# Patient Record
Sex: MALE
[De-identification: names, ages, dates, MRNs, and addresses within clinical notes are randomized per-mention and may not be internally consistent; named-entity substitution may affect disease eponyms.]

---

## 2020-06-09 PROBLEM — Z00.00 ENCOUNTER FOR PREVENTIVE HEALTH EXAMINATION: Status: ACTIVE | Noted: 2020-06-09

## 2023-04-10 ENCOUNTER — APPOINTMENT (OUTPATIENT)
Dept: CARDIOLOGY | Facility: CLINIC | Age: 73
End: 2023-04-10
Payer: MEDICARE

## 2023-04-10 ENCOUNTER — NON-APPOINTMENT (OUTPATIENT)
Age: 73
End: 2023-04-10

## 2023-04-10 VITALS — SYSTOLIC BLOOD PRESSURE: 146 MMHG | DIASTOLIC BLOOD PRESSURE: 70 MMHG

## 2023-04-10 VITALS
DIASTOLIC BLOOD PRESSURE: 62 MMHG | BODY MASS INDEX: 21.52 KG/M2 | HEIGHT: 68 IN | SYSTOLIC BLOOD PRESSURE: 102 MMHG | HEART RATE: 57 BPM | OXYGEN SATURATION: 96 % | WEIGHT: 142 LBS

## 2023-04-10 DIAGNOSIS — Z87.09 PERSONAL HISTORY OF OTHER DISEASES OF THE RESPIRATORY SYSTEM: ICD-10-CM

## 2023-04-10 DIAGNOSIS — E11.9 TYPE 2 DIABETES MELLITUS W/OUT COMPLICATIONS: ICD-10-CM

## 2023-04-10 DIAGNOSIS — Z87.891 PERSONAL HISTORY OF NICOTINE DEPENDENCE: ICD-10-CM

## 2023-04-10 DIAGNOSIS — Z78.9 OTHER SPECIFIED HEALTH STATUS: ICD-10-CM

## 2023-04-10 DIAGNOSIS — Z82.3 FAMILY HISTORY OF STROKE: ICD-10-CM

## 2023-04-10 DIAGNOSIS — S88.919A COMPLETE TRAUMATIC AMPUTATION OF UNSPECIFIED LOWER LEG, LVL UNSPECIFIED, INITIAL ENCOUNTER: ICD-10-CM

## 2023-04-10 DIAGNOSIS — Z82.49 FAMILY HISTORY OF ISCHEMIC HEART DISEASE AND OTHER DISEASES OF THE CIRCULATORY SYSTEM: ICD-10-CM

## 2023-04-10 PROCEDURE — 99204 OFFICE O/P NEW MOD 45 MIN: CPT

## 2023-04-10 RX ORDER — CLOPIDOGREL BISULFATE 75 MG/1
75 TABLET, FILM COATED ORAL DAILY
Qty: 90 | Refills: 3 | Status: ACTIVE | COMMUNITY

## 2023-04-10 RX ORDER — GABAPENTIN 300 MG/1
300 CAPSULE ORAL 3 TIMES DAILY
Refills: 0 | Status: ACTIVE | COMMUNITY

## 2023-04-10 RX ORDER — ATORVASTATIN CALCIUM 20 MG/1
20 TABLET, FILM COATED ORAL
Refills: 0 | Status: ACTIVE | COMMUNITY

## 2023-04-10 NOTE — REASON FOR VISIT
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  Dr. Araujo performed the amputation.\par \par The patient is able to ride a bicycle.  The patient arrives for fairly prolonged periods without exertional chest discomfort.  The patient recently was admitted with what was felt to be a COPD exacerbation.  He was significantly short of breath.  The patient is improving and there has been no shortness of breath recently.\par \par The patient has lost a tremendous amount of weight.  Previously had hypertension.  He stopped all of his pharmacologic therapy for hypertension and he has been persistently normotensive and on the low side of blood pressure at home.  There are no complaints.

## 2023-04-10 NOTE — ASSESSMENT
[FreeTextEntry1] : Shortness of breath: The patient would not be able to exercise adequately for diagnostic purposes because of an amputation.  Pharmacologic nuclear stress testing is the best option to rule out ischemia.  Transthoracic echocardiography has been arranged to rule out valvulopathy.\par \par Hypertension: The patient is fairly persistently with low blood pressure at home on both sides.  Overall we will withhold pharmacologic therapy for hypertension at this time.\par \par The patient is present with his niece who is extremely involved in the patient's health care.

## 2023-04-27 ENCOUNTER — APPOINTMENT (OUTPATIENT)
Dept: CARDIOLOGY | Facility: CLINIC | Age: 73
End: 2023-04-27
Payer: MEDICARE

## 2023-04-27 PROCEDURE — 93015 CV STRESS TEST SUPVJ I&R: CPT

## 2023-04-27 PROCEDURE — 93306 TTE W/DOPPLER COMPLETE: CPT

## 2023-04-27 PROCEDURE — 78452 HT MUSCLE IMAGE SPECT MULT: CPT

## 2023-04-27 PROCEDURE — A9502: CPT

## 2023-05-01 ENCOUNTER — APPOINTMENT (OUTPATIENT)
Dept: CARDIOLOGY | Facility: CLINIC | Age: 73
End: 2023-05-01
Payer: MEDICARE

## 2023-05-01 VITALS
DIASTOLIC BLOOD PRESSURE: 70 MMHG | WEIGHT: 142 LBS | BODY MASS INDEX: 21.52 KG/M2 | SYSTOLIC BLOOD PRESSURE: 110 MMHG | HEART RATE: 73 BPM | OXYGEN SATURATION: 94 % | HEIGHT: 68 IN

## 2023-05-01 DIAGNOSIS — I10 ESSENTIAL (PRIMARY) HYPERTENSION: ICD-10-CM

## 2023-05-01 DIAGNOSIS — R06.00 DYSPNEA, UNSPECIFIED: ICD-10-CM

## 2023-05-01 PROCEDURE — 99213 OFFICE O/P EST LOW 20 MIN: CPT

## 2023-05-01 NOTE — ASSESSMENT
[FreeTextEntry1] : Shortness of breath: None recently.  The patient has a prosthesis because of his amputation.  He walks fairly well with a walker.  There is no exertional symptoms.  Noninvasive testing is negative.\par \par Hypertension: Well-controlled.\par \par Hyperlipidemia: Continue statin therapy.  The patient gets routine phlebotomy through the primary care doctor's office.\par \par Peripheral vascular disease: The patient was given clopidogrel by Dr. Araujo, will continue.

## 2024-10-30 ENCOUNTER — OFFICE (OUTPATIENT)
Dept: URBAN - METROPOLITAN AREA CLINIC 9 | Facility: CLINIC | Age: 74
Setting detail: OPHTHALMOLOGY
End: 2024-10-30
Payer: MEDICARE

## 2024-10-30 DIAGNOSIS — H11.153: ICD-10-CM

## 2024-10-30 DIAGNOSIS — E11.9: ICD-10-CM

## 2024-10-30 DIAGNOSIS — H01.001: ICD-10-CM

## 2024-10-30 DIAGNOSIS — H35.40: ICD-10-CM

## 2024-10-30 DIAGNOSIS — H01.004: ICD-10-CM

## 2024-10-30 DIAGNOSIS — H25.13: ICD-10-CM

## 2024-10-30 PROCEDURE — 92004 COMPRE OPH EXAM NEW PT 1/>: CPT | Performed by: OPHTHALMOLOGY

## 2024-10-30 ASSESSMENT — REFRACTION_AUTOREFRACTION
OD_AXIS: 136
OS_SPHERE: +0.50
OS_CYLINDER: +1.50
OS_AXIS: 167
OD_CYLINDER: +0.25
OD_SPHERE: -19.00

## 2024-10-30 ASSESSMENT — REFRACTION_MANIFEST
OS_CYLINDER: +1.00
OS_SPHERE: +0.50
OD_VA1: HM
OD_AXIS: 135
OS_VA2: 20/25(J1)
OS_ADD: +3.50
OU_VA: 20/40+1
OD_CYLINDER: +0.25
OD_SPHERE: -19.00
OD_VA2: HM
OD_ADD: +3.50
OS_AXIS: 165
OS_VA1: 20/40+1

## 2024-10-30 ASSESSMENT — KERATOMETRY
OS_AXISANGLE_DEGREES: 167
METHOD_AUTO_MANUAL: AUTO
OD_AXISANGLE_DEGREES: 010
OS_K2POWER_DIOPTERS: 44.50
OD_K2POWER_DIOPTERS: 43.75
OS_K1POWER_DIOPTERS: 43.50
OD_K1POWER_DIOPTERS: 42.50

## 2024-10-30 ASSESSMENT — REFRACTION_CURRENTRX
OS_SPHERE: +1.25
OD_VPRISM_DIRECTION: BF
OS_ADD: +3.25
OD_SPHERE: PLANO
OD_OVR_VA: 20/
OS_VPRISM_DIRECTION: BF
OD_ADD: +3.25
OS_OVR_VA: 20/
OS_CYLINDER: SPHERE

## 2024-10-30 ASSESSMENT — LID EXAM ASSESSMENTS
OS_BLEPHARITIS: LUL 2+
OD_BLEPHARITIS: RUL 2+

## 2024-10-30 ASSESSMENT — VISUAL ACUITY
OD_BCVA: 20/60+1
OS_BCVA: HM

## 2024-10-30 ASSESSMENT — CONFRONTATIONAL VISUAL FIELD TEST (CVF): OS_FINDINGS: FULL

## 2024-11-21 ENCOUNTER — OFFICE (OUTPATIENT)
Dept: URBAN - METROPOLITAN AREA CLINIC 38 | Facility: CLINIC | Age: 74
Setting detail: OPHTHALMOLOGY
End: 2024-11-21
Payer: MEDICARE

## 2024-11-21 DIAGNOSIS — H25.13: ICD-10-CM

## 2024-11-21 PROCEDURE — 99214 OFFICE O/P EST MOD 30 MIN: CPT | Performed by: OPHTHALMOLOGY

## 2024-11-21 ASSESSMENT — KERATOMETRY
OS_K1POWER_DIOPTERS: 44.00
METHOD_AUTO_MANUAL: AUTO
OS_AXISANGLE_DEGREES: 162
OS_K2POWER_DIOPTERS: 44.75

## 2024-11-21 ASSESSMENT — REFRACTION_AUTOREFRACTION
OS_CYLINDER: -0.75
OS_AXIS: 048
OS_SPHERE: +1.75

## 2024-11-21 ASSESSMENT — VISUAL ACUITY
OS_BCVA: HM
OD_BCVA: 20/60+1

## 2024-11-21 ASSESSMENT — REFRACTION_MANIFEST
OS_ADD: +3.50
OS_AXIS: 075
OS_VA1: 20/40+1
OD_AXIS: 045
OS_CYLINDER: -1.00
OD_VA2: HM
OS_VA2: 20/25(J1)
OD_CYLINDER: -0.25
OD_VA1: HM
OD_SPHERE: -18.75
OD_ADD: +3.50
OU_VA: 20/40+1
OS_SPHERE: +1.50

## 2024-11-21 ASSESSMENT — REFRACTION_CURRENTRX
OS_OVR_VA: 20/
OS_SPHERE: +1.25
OD_VPRISM_DIRECTION: BF
OS_VPRISM_DIRECTION: BF
OD_ADD: +3.25
OS_CYLINDER: SPHERE
OS_ADD: +3.25
OD_SPHERE: PLANO
OD_OVR_VA: 20/

## 2024-11-21 ASSESSMENT — CONFRONTATIONAL VISUAL FIELD TEST (CVF)
OD_FINDINGS: FULL
OS_FINDINGS: FULL

## 2024-11-21 ASSESSMENT — TONOMETRY
OS_IOP_MMHG: 19
OD_IOP_MMHG: 17

## 2024-11-21 ASSESSMENT — LID EXAM ASSESSMENTS
OS_BLEPHARITIS: LUL 2+
OD_BLEPHARITIS: RUL 2+

## 2025-01-02 ENCOUNTER — OFFICE (OUTPATIENT)
Dept: URBAN - METROPOLITAN AREA CLINIC 38 | Facility: CLINIC | Age: 75
Setting detail: OPHTHALMOLOGY
End: 2025-01-02
Payer: MEDICARE

## 2025-01-02 DIAGNOSIS — H25.11: ICD-10-CM

## 2025-01-02 DIAGNOSIS — H25.13: ICD-10-CM

## 2025-01-02 PROCEDURE — 99213 OFFICE O/P EST LOW 20 MIN: CPT | Performed by: OPHTHALMOLOGY

## 2025-01-02 PROCEDURE — 92136 OPHTHALMIC BIOMETRY: CPT | Mod: TC | Performed by: OPHTHALMOLOGY

## 2025-01-02 PROCEDURE — 92136 OPHTHALMIC BIOMETRY: CPT | Mod: 26,RT | Performed by: OPHTHALMOLOGY

## 2025-01-02 ASSESSMENT — REFRACTION_CURRENTRX
OS_VPRISM_DIRECTION: BF
OS_OVR_VA: 20/
OD_ADD: +3.25
OS_CYLINDER: SPHERE
OD_VPRISM_DIRECTION: BF
OS_ADD: +3.25
OD_OVR_VA: 20/
OD_SPHERE: PLANO
OS_SPHERE: +1.25

## 2025-01-02 ASSESSMENT — KERATOMETRY
OS_AXISANGLE_DEGREES: 162
OS_K1POWER_DIOPTERS: 44.00
METHOD_AUTO_MANUAL: AUTO
OS_K2POWER_DIOPTERS: 44.75

## 2025-01-02 ASSESSMENT — REFRACTION_MANIFEST
OS_VA2: 20/25(J1)
OU_VA: 20/40+1
OD_CYLINDER: -0.25
OD_VA2: HM
OS_ADD: +3.50
OS_AXIS: 075
OS_CYLINDER: -1.00
OD_VA1: HM
OS_SPHERE: +1.50
OS_VA1: 20/40+1
OD_SPHERE: -18.75
OD_ADD: +3.50
OD_AXIS: 045

## 2025-01-02 ASSESSMENT — VISUAL ACUITY
OS_BCVA: HM
OD_BCVA: 20/60+1

## 2025-01-02 ASSESSMENT — CONFRONTATIONAL VISUAL FIELD TEST (CVF)
OD_FINDINGS: FULL
OS_FINDINGS: FULL

## 2025-01-02 ASSESSMENT — TONOMETRY
OD_IOP_MMHG: 19
OS_IOP_MMHG: 19

## 2025-01-02 ASSESSMENT — LID EXAM ASSESSMENTS
OD_BLEPHARITIS: RUL 2+
OS_BLEPHARITIS: LUL 2+

## 2025-01-02 ASSESSMENT — REFRACTION_AUTOREFRACTION
OS_SPHERE: +1.75
OS_CYLINDER: -0.75
OS_AXIS: 048

## 2025-01-14 ENCOUNTER — AMBULATORY SURGERY CENTER (OUTPATIENT)
Dept: URBAN - METROPOLITAN AREA SURGERY 4 | Facility: SURGERY | Age: 75
Setting detail: OPHTHALMOLOGY
End: 2025-01-14
Payer: MEDICARE

## 2025-01-14 DIAGNOSIS — H25.11: ICD-10-CM

## 2025-01-14 PROCEDURE — 66984 XCAPSL CTRC RMVL W/O ECP: CPT | Mod: RT | Performed by: OPHTHALMOLOGY

## 2025-01-15 ENCOUNTER — OFFICE (OUTPATIENT)
Dept: URBAN - METROPOLITAN AREA CLINIC 8 | Facility: CLINIC | Age: 75
Setting detail: OPHTHALMOLOGY
End: 2025-01-15
Payer: MEDICARE

## 2025-01-15 ENCOUNTER — RX ONLY (RX ONLY)
Age: 75
End: 2025-01-15

## 2025-01-15 DIAGNOSIS — H25.12: ICD-10-CM

## 2025-01-15 PROCEDURE — 92136 OPHTHALMIC BIOMETRY: CPT | Mod: 26,LT | Performed by: OPHTHALMOLOGY

## 2025-01-15 ASSESSMENT — REFRACTION_AUTOREFRACTION
OS_SPHERE: +2.00
OD_AXIS: 024
OS_CYLINDER: -1.75
OS_AXIS: 076
OD_SPHERE: PLANO
OD_CYLINDER: -1.00

## 2025-01-15 ASSESSMENT — TONOMETRY: OD_IOP_MMHG: 21

## 2025-01-15 ASSESSMENT — VISUAL ACUITY
OS_BCVA: 20/50-
OD_BCVA: 20/60+1

## 2025-01-15 ASSESSMENT — CORNEAL EDEMA - FOLDS/STRIAE: OD_FOLDSSTRIAE: 1+ 2+

## 2025-01-15 ASSESSMENT — KERATOMETRY
OS_AXISANGLE_DEGREES: 167
OD_K1POWER_DIOPTERS: 44.25
OD_K2POWER_DIOPTERS: 45.50
METHOD_AUTO_MANUAL: AUTO
OS_K1POWER_DIOPTERS: 43.50
OS_K2POWER_DIOPTERS: 44.75
OD_AXISANGLE_DEGREES: 085

## 2025-01-15 ASSESSMENT — LID EXAM ASSESSMENTS
OS_BLEPHARITIS: LUL 2+
OD_BLEPHARITIS: RUL 2+

## 2025-01-15 ASSESSMENT — REFRACTION_CURRENTRX
OS_VPRISM_DIRECTION: BF
OD_VPRISM_DIRECTION: BF
OS_CYLINDER: SPHERE
OS_ADD: +3.25
OD_OVR_VA: 20/
OD_ADD: +3.25
OS_SPHERE: +1.25
OS_OVR_VA: 20/
OD_SPHERE: PLANO

## 2025-01-15 ASSESSMENT — REFRACTION_MANIFEST
OS_VA1: 20/40+1
OS_SPHERE: +1.50
OD_AXIS: 045
OS_VA2: 20/25(J1)
OS_CYLINDER: -1.00
OS_AXIS: 075
OD_ADD: +3.50
OU_VA: 20/40+1
OS_ADD: +3.50
OD_SPHERE: -18.75
OD_CYLINDER: -0.25

## 2025-01-15 ASSESSMENT — CONFRONTATIONAL VISUAL FIELD TEST (CVF)
OD_FINDINGS: FULL
OS_FINDINGS: FULL

## 2025-01-15 ASSESSMENT — CORNEAL EDEMA CLINICAL DESCRIPTION: OD_CORNEALEDEMA: 1+ 2+ @ INCISION

## 2025-01-28 ENCOUNTER — AMBULATORY SURGERY CENTER (OUTPATIENT)
Dept: URBAN - METROPOLITAN AREA SURGERY 4 | Facility: SURGERY | Age: 75
Setting detail: OPHTHALMOLOGY
End: 2025-01-28
Payer: MEDICARE

## 2025-01-28 DIAGNOSIS — H25.12: ICD-10-CM

## 2025-01-28 PROCEDURE — 66984 XCAPSL CTRC RMVL W/O ECP: CPT | Mod: 79,LT | Performed by: OPHTHALMOLOGY

## 2025-01-29 ENCOUNTER — OFFICE (OUTPATIENT)
Dept: URBAN - METROPOLITAN AREA CLINIC 38 | Facility: CLINIC | Age: 75
Setting detail: OPHTHALMOLOGY
End: 2025-01-29
Payer: MEDICARE

## 2025-01-29 DIAGNOSIS — Z96.1: ICD-10-CM

## 2025-01-29 PROCEDURE — 99024 POSTOP FOLLOW-UP VISIT: CPT

## 2025-01-29 ASSESSMENT — REFRACTION_AUTOREFRACTION
OS_AXIS: 097
OS_SPHERE: PLANO
OD_AXIS: 110
OD_CYLINDER: -1.25
OS_CYLINDER: -1.50
OD_SPHERE: +1.00

## 2025-01-29 ASSESSMENT — REFRACTION_CURRENTRX
OD_OVR_VA: 20/
OS_CYLINDER: SPHERE
OS_OVR_VA: 20/
OD_SPHERE: PLANO
OD_VPRISM_DIRECTION: BF
OS_VPRISM_DIRECTION: BF
OS_ADD: +3.25
OS_SPHERE: +1.25
OD_ADD: +3.25

## 2025-01-29 ASSESSMENT — KERATOMETRY
OS_K2POWER_DIOPTERS: 45.00
OS_K1POWER_DIOPTERS: 44.25
METHOD_AUTO_MANUAL: AUTO
OS_AXISANGLE_DEGREES: 178
OD_AXISANGLE_DEGREES: 037
OD_K2POWER_DIOPTERS: 44.00
OD_K1POWER_DIOPTERS: 43.50

## 2025-01-29 ASSESSMENT — TONOMETRY: OD_IOP_MMHG: 20

## 2025-01-29 ASSESSMENT — REFRACTION_MANIFEST
OD_CYLINDER: -0.25
OD_SPHERE: -18.75
OD_ADD: +3.50
OS_VA1: 20/40+1
OS_SPHERE: +1.50
OU_VA: 20/40+1
OD_AXIS: 045
OS_AXIS: 075
OS_VA2: 20/25(J1)
OS_CYLINDER: -1.00
OS_ADD: +3.50

## 2025-01-29 ASSESSMENT — CONFRONTATIONAL VISUAL FIELD TEST (CVF)
OS_FINDINGS: FULL
OD_FINDINGS: FULL

## 2025-01-29 ASSESSMENT — CORNEAL EDEMA CLINICAL DESCRIPTION: OS_CORNEALEDEMA: 1+ 2+ @ INCISION

## 2025-01-29 ASSESSMENT — VISUAL ACUITY
OS_BCVA: 20/30
OD_BCVA: 20/50

## 2025-01-29 ASSESSMENT — LID EXAM ASSESSMENTS
OD_BLEPHARITIS: RUL 2+
OS_BLEPHARITIS: LUL 2+

## 2025-03-13 ENCOUNTER — OFFICE (OUTPATIENT)
Dept: URBAN - METROPOLITAN AREA CLINIC 38 | Facility: CLINIC | Age: 75
Setting detail: OPHTHALMOLOGY
End: 2025-03-13
Payer: MEDICARE

## 2025-03-13 DIAGNOSIS — Z96.1: ICD-10-CM

## 2025-03-13 PROCEDURE — 99024 POSTOP FOLLOW-UP VISIT: CPT | Performed by: OPHTHALMOLOGY

## 2025-03-13 ASSESSMENT — REFRACTION_MANIFEST
OS_CYLINDER: -1.00
OD_CYLINDER: -1.00
OD_ADD: +2.25
OU_VA: 20/25-2
OD_CYLINDER: -1.00
OS_AXIS: 095
OS_VA1: 20/25-2
OS_VA2: 20/25(J1)
OS_ADD: +2.25
OS_ADD: +3.50
OD_SPHERE: +1.00
OD_AXIS: 110
OD_VA1: 20/30
OS_CYLINDER: -1.00
OD_SPHERE: +1.25
OS_AXIS: 095
OU_VA: 20/25-2
OS_VA2: 20/25(J1)
OS_VA1: 20/25-2
OS_SPHERE: +0.25
OD_AXIS: 110
OD_ADD: +3.50
OD_VA1: 20/30
OS_SPHERE: PLANO

## 2025-03-13 ASSESSMENT — REFRACTION_AUTOREFRACTION
OS_SPHERE: +0.50
OS_CYLINDER: -1.50
OD_CYLINDER: -1.50
OD_SPHERE: +1.75
OS_AXIS: 093
OD_AXIS: 110

## 2025-03-13 ASSESSMENT — REFRACTION_CURRENTRX
OS_CYLINDER: SPHERE
OD_VPRISM_DIRECTION: BF
OS_SPHERE: +1.25
OS_VPRISM_DIRECTION: BF
OS_ADD: +3.25
OD_SPHERE: PLANO
OS_OVR_VA: 20/
OD_OVR_VA: 20/
OD_ADD: +3.25

## 2025-03-13 ASSESSMENT — CONFRONTATIONAL VISUAL FIELD TEST (CVF)
OS_FINDINGS: FULL
OD_FINDINGS: FULL

## 2025-03-13 ASSESSMENT — VISUAL ACUITY
OS_BCVA: 20/30
OD_BCVA: 20/30

## 2025-03-13 ASSESSMENT — TONOMETRY
OS_IOP_MMHG: 11
OD_IOP_MMHG: 11

## 2025-03-13 ASSESSMENT — KERATOMETRY
OS_AXISANGLE_DEGREES: 179
METHOD_AUTO_MANUAL: AUTO
OD_K2POWER_DIOPTERS: 44.00
OD_K1POWER_DIOPTERS: 43.50
OD_AXISANGLE_DEGREES: 032
OS_K1POWER_DIOPTERS: 44.00
OS_K2POWER_DIOPTERS: 44.75

## 2025-03-13 ASSESSMENT — LID EXAM ASSESSMENTS
OS_BLEPHARITIS: LUL 2+
OD_BLEPHARITIS: RUL 2+